# Patient Record
Sex: MALE | Race: WHITE
[De-identification: names, ages, dates, MRNs, and addresses within clinical notes are randomized per-mention and may not be internally consistent; named-entity substitution may affect disease eponyms.]

---

## 2017-10-16 ENCOUNTER — HOSPITAL ENCOUNTER (EMERGENCY)
Dept: HOSPITAL 25 - ED | Age: 22
Discharge: HOME | End: 2017-10-16
Payer: COMMERCIAL

## 2017-10-16 VITALS — DIASTOLIC BLOOD PRESSURE: 72 MMHG | SYSTOLIC BLOOD PRESSURE: 147 MMHG
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
## 2017-10-16 DIAGNOSIS — S43.015A: Primary | ICD-10-CM
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
## 2017-10-16 DIAGNOSIS — S42.292A: ICD-10-CM

## 2017-10-16 PROCEDURE — 96375 TX/PRO/DX INJ NEW DRUG ADDON: CPT

## 2017-10-16 PROCEDURE — 96374 THER/PROPH/DIAG INJ IV PUSH: CPT

## 2017-10-16 PROCEDURE — 99283 EMERGENCY DEPT VISIT LOW MDM: CPT

## 2017-10-16 PROCEDURE — 23650 CLTX SHO DSLC W/MNPJ WO ANES: CPT

## 2017-10-16 NOTE — RAD
Indication: Left shoulder injury.



4 views of left shoulder demonstrates anterior inferior dislocation of left humeral head.



IMPRESSION: Anterior inferior dislocation of left humeral head.

## 2017-10-16 NOTE — RAD
Indication: Left clavicle injury.



2 views of left clavicle demonstrates no fracture. No other bone or joint identified.



IMPRESSION: No fracture of the left clavicle.

## 2017-10-16 NOTE — ED
Upper Extremity Pain





- HPI Summary


HPI Summary: 





21 male presents to ED with complaints of left shoulder pain, injury and 

deformity. States he thinks it is dislocated. Sustained when playing flag 

football and being ran into/tackled just PTA, today. Patient has not taken 

anything for pain. Admits to some numbness/tingling. No other injuries or 

complaints at this time. Has never injured shoulder in the past. No PMHx. 





- History of Current Complaint


Chief Complaint: EDExtremityUpper


Stated Complaint: LT SHOULDER INJURY


Time Seen by Provider: 10/16/17 20:33


Hx Obtained From: Patient


Mechanism Of Injury: Blunt Trauma, Twisted


Onset/Duration: Started Hours Ago, Traumatic, Still Present, Worse Since


Timing: Constant


Severity Initially: Severe


Severity Currently: Severe


Pain Location: Shoulder - left


Character: Sharp, Aching


Aggravating Factor(s): Movement


Alleviating Factor(s): Nothing, Rest


Associated Signs & Symptoms: Positive: Numbness/Tingling, Other - obvious 

deformity


Related History: Dominant Hand Right





- Allergies/Home Medications


Allergies/Adverse Reactions: 


 Allergies











Allergy/AdvReac Type Severity Reaction Status Date / Time


 


No Known Allergies Allergy   Verified 10/16/17 19:49











Home Medications: 


 Home Medications





NK [No Home Medications Reported]  10/16/17 [History Confirmed 10/16/17]











PMH/Surg Hx/FS Hx/Imm Hx


Endocrine/Hematology History: 


   Denies: Hx Diabetes


Cardiovascular History: 


   Denies: Hx Hypertension


Respiratory History: 


   Denies: Hx Asthma





- Surgical History


Surgery Procedure, Year, and Place: none





- Immunization History


Immunizations Up to Date: Yes


Infectious Disease History: No


Infectious Disease History: 


   Denies: Traveled Outside the [place] in Last 30 Days





- Family History


Known Family History: Positive: None





- Social History


Alcohol Use: Weekly


Substance Use Type: Reports: None


Smoking Status (MU): Never Smoked Tobacco





Review of Systems


Constitutional: Negative


Cardiovascular: Negative


Respiratory: Negative


Positive: Arthralgia, Myalgia, Decreased ROM - left shoulder 


Skin: Negative


Positive: Paresthesia


All Other Systems Reviewed And Are Negative: Yes





Physical Exam


Triage Information Reviewed: Yes


Vital Signs On Initial Exam: 


 Initial Vitals











Temp Pulse Resp BP Pulse Ox


 


 98.7 F   84   18   155/84   95 


 


 10/16/17 19:28  10/16/17 19:28  10/16/17 19:28  10/16/17 19:28  10/16/17 19:28








slightly elevated BP however patient in significant pain, decreased throughout 

stay after procedure 


Vital Signs Reviewed: Yes


Appearance: Positive: Well-Appearing, Well-Nourished, Pain Distress - severe


Skin: Positive: Warm, Skin Color Reflects Adequate Perfusion, Dry, Cold - 

slightly colder left hand when compared to right, Pale - left hand however 

strong radial pulse, Other - no significant sweling or ecchymosis noted. 

obvious deformity left shoulder.  Negative: Erythema @


Head/Face: Positive: Normal Head/Face Inspection


Eyes: Positive: Conjunctiva Clear


ENT: Positive: Normal ENT inspection, Hearing grossly normal, Pharynx normal, 

TMs normal


Neck: Positive: Supple, Nontender


Respiratory/Lung Sounds: Positive: Clear to Auscultation, Breath Sounds 

Present.  Negative: Rales, Rhonchi, Wheezes


Cardiovascular: Positive: Normal, RRR, Pulses are Symmetrical in both Upper and 

Lower Extremities - 2+ radial, b/l, normal cap refill.  Negative: Murmur, Rub


Abdomen Description: Positive: Nontender, Soft


Bowel Sounds: Positive: Present


Musculoskeletal: Positive: Limited @ - left UE, Interruption @ - left shoulder, 

Abnormal @ - left shoulder, Pain @, Other - obvious deformity, dislocation left 

shoulder, pain on palpation of left distal clavicle.  Negative: Edema Left


Neurological: Positive: Normal, Sensory/Motor Intact, Alert, Oriented to Person 

Place, Time, CN Intact II-III, Reflexes Intact, NV Bundle Intact Distally, 

Normal Gait





- [place] Coma Scale


Coma Scale Total: 15





Procedures





- Joint Reduction


Joint Reduction Site: shoulder (L)


Conscious Sedation: Yes


Reduction Attempts: 1 - by Dr Smith


Pre-Procedure NV Exam: Yes


Post Joint Reduction Film: no fracture seen





Diagnostics





- Vital Signs


 Vital Signs











  Temp Pulse Resp BP Pulse Ox


 


 10/16/17 22:00   100  19   100


 


 10/16/17 21:55   140  16  134/58  81


 


 10/16/17 21:50   91  18   100


 


 10/16/17 21:45   92  19  133/69  100


 


 10/16/17 21:40   98  20  133/67  100


 


 10/16/17 21:35   93  14  128/66  98


 


 10/16/17 21:30   99  20  135/76  100


 


 10/16/17 21:26    16  


 


 10/16/17 21:25   95  19  137/66  100


 


 10/16/17 21:24   94  23  134/76  100


 


 10/16/17 21:17   97    100


 


 10/16/17 21:15     140/48 


 


 10/16/17 19:35  99 F  91  19  127/63  100


 


 10/16/17 19:28  98.7 F  84  18  155/84  95














- Laboratory


Lab Statement: Any lab studies that have been ordered have been reviewed, and 

results considered in the medical decision making process.





- Radiology


  ** left shoulder


Xray Interpretation: Positive (See Comments) - anterior inferior dislocation of 

left humeral head





  ** left clavicle


Xray Interpretation: No Acute Changes - no fracture of clavicle is seen


Radiology Interpretation Completed By: Radiologist





  ** post reduction left shoulder


Xray Interpretation: Positive (See Comments) - status post external reduction. 

bones are normal aligmnment, this is a hill-sachs fracture present


Radiology Interpretation Completed By: Radiologist





Course/Dx





- Course


Course Of Treatment: xrays obtained showing dislocation of left shoulder. Dr Smith orthopedist 9:30pm preformed mild conscious sedation and reduction of 

left shoulder without complication. Reduced, post reduction xray obtained and 

showed successful reduction however hill-sachs fracture was noted. patient was 

placed in sling. observed after sedation. normal vitals throughout procedure. 

Pain medication at home, follow up with Ever tomorrow. Aware of worsening 

signs and symptoms to watch out for, return if occur. Rest and no physical 

activity/use of left shoulder. Patient agrees and understands plan. Tolerated 

procedure well.





- Diagnoses


Provider Diagnoses: 


 Dislocation of left shoulder joint, Hill-Sachs fracture of left humerus








- Physician Notifications


Discussed Care of Patient With: Dr Smith


Time Discussed With Above Provider: 21:30


Instructed by Provider To: MD Will See In ED - and follow up call for 

appointment





Discharge





- Discharge Plan


Condition: Stable


Disposition: HOME


Patient Education Materials:  Shoulder Dislocation (ED)


Referrals: 


[facility] [Primary Care Provider] - 


Opal Curtis MD [Medical Doctor] - 


Additional Instructions: 


Keep shoulder resting in sling.


Take Advil for pain and inflammation.


Ice. Do not participate in physical activity until cleared by ortho.


Follow up with Ortho, call and make an appointment.


Any new or worsening signs/symptoms please seek medical attention promptly.

## 2017-10-17 NOTE — CONS
CONSULTATION REPORT:

 

DATE OF CONSULT:  10/16/17 - EMERGENCY DEPT

 

CONSULTING SERVICE:  Orthopedics.

 

REQUESTING SERVICE:  ER.

 

CHIEF COMPLAINT:  Left shoulder pain.

 

HISTORY OF PRESENT ILLNESS:  Mamadou is a healthy 21-year-old right hand 
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  He was playing flag football 
this afternoon when his left arm got extended backwards and externally rotated.
  He immediately had left shoulder pain and was brought into the emergency 
room.  Denies any other injuries.  Does note some tingling in his left hand.

 

PAST MEDICAL HISTORY:  Denies.

 

PAST SURGICAL HISTORY:  Denies.

 

MEDICATIONS:  No medications.

 

ALLERGIES:  No known drug allergies.

 

PHYSICAL EXAM:  He is well appearing and in no apparent distress.  Nonlabored 
breathing.  Normal mood and affect.  Examination of the left upper extremity 
reveals skin is intact.  No tenderness to palpation along the clavicle or the 
AC joint.  Tenderness to palpation at the glenohumeral joint and obvious 
deformity. Sensation is intact to light touch in the axillary, radial, median, 
and ulnar nerve distributions, although he does have a bit of tingling in his 
fingertips.  Palpable radial pulse.  Positive EPL, OP and first PATRICK.  Painless 
range of motion of his elbow and wrist.

 

IMAGING:  Left shoulder x-rays reveal an anterior and inferior glenohumeral 
dislocation.  No fractures are appreciated about the clavicle or shoulder.

 

ASSESSMENT AND PLAN:  Mamadou is a healthy 21-year-old right hand dominant 
male with a left shoulder glenohumeral joint dislocation.  This is a first-time 
dislocation.  I discussed the diagnosis and treatment options with him and 
after having this discussion, he decided he would like to move forward with a 
closed reduction.  I explained the risks and benefits to him and received his 
informed written consent.  Conscious sedation was provided by the emergency 
room and I close reduced the left glenohumeral joint with axial traction 
countertraction.  There was a palpable clunk and relief of his pain.  Afterwards
, he was neurovascularly intact and had improved sensation in the fingertips as 
well as being intact in the axillary nerve, radial, median, and ulnar nerves.  
Post-reduction x-rays will be obtained.  He was placed into a sling and will 
remain in that until he follows up with one of my colleagues in Sports Medicine 
in the next 1 to 2 weeks.  All of his questions were answered.

 

 [id number]/[facility] #: [id number]

[facility]

## 2017-10-17 NOTE — RAD
INDICATION:  Left shoulder dislocation.



COMPARISON: Comparison is made with a prior x-ray study of the left shoulder of the same

day.



TECHNIQUE: 3 views of the left shoulder were obtained.



FINDINGS:  The patient is status post external reduction of the left shoulder. The bones

are in normal alignment. There is a Hill-Sachs fracture present. No additional fracture is

seen.  Joint spaces appear maintained.



IMPRESSION:  STATUS POST EXTERNAL REDUCTION. THE BONES ARE NORMAL ALIGNMENT. THERE IS A

HILL-SACHS FRACTURE PRESENT.

## 2018-02-09 ENCOUNTER — HOSPITAL ENCOUNTER (EMERGENCY)
Dept: HOSPITAL 25 - ED | Age: 23
Discharge: HOME | End: 2018-02-09
Payer: COMMERCIAL

## 2018-02-09 VITALS — DIASTOLIC BLOOD PRESSURE: 82 MMHG | SYSTOLIC BLOOD PRESSURE: 122 MMHG

## 2018-02-09 DIAGNOSIS — S43.005A: Primary | ICD-10-CM
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
## 2018-02-09 PROCEDURE — 99283 EMERGENCY DEPT VISIT LOW MDM: CPT

## 2018-02-09 PROCEDURE — 23650 CLTX SHO DSLC W/MNPJ WO ANES: CPT

## 2018-02-09 PROCEDURE — 96374 THER/PROPH/DIAG INJ IV PUSH: CPT

## 2018-02-09 PROCEDURE — 96376 TX/PRO/DX INJ SAME DRUG ADON: CPT

## 2018-02-09 PROCEDURE — 96360 HYDRATION IV INFUSION INIT: CPT

## 2018-02-09 NOTE — RAD
HISTORY: Post reduction



COMPARISONS: February 09, 2018 at 6:04 PM, October 16, 2017



VIEWS: 2, frontal and outlet views of the left shoulder    



FINDINGS:



BONE DENSITY: Normal.

BONES: There is a Hill-Sachs deformity of the humeral head. This is similar to the

previous examination.    

JOINTS: There is no arthropathy.    

ALIGNMENT: There has been interval reduction of the glenohumeral dislocation.

SOFT TISSUES: Unremarkable.



OTHER FINDINGS: None.



IMPRESSION: 

INTERVAL REDUCTION OF LEFT SHOULDER DISLOCATION

## 2018-02-09 NOTE — ED
Ap KAUR Jennifer, scribed for Chris Greene MD on 02/09/18 at 1749 .





Upper Extremity Pain





- HPI Summary


HPI Summary: 





The patient is a 22 year old male who presents to the ED with left shoulder 

pain after playing basketball about 40 minutes ago. The patient has had 

multiple injuries to the left shoulder in the past and says it feels like the 

time he dislocated his shoulder. He also complains of some numbness in his left 

fingers.





- History of Current Complaint


Chief Complaint: EDExtremityUpper


Stated Complaint: LT SHOULDER INJURY


Hx Obtained From: Patient


Mechanism Of Injury: Other - Playing basketball


Onset/Duration: Started Minutes Ago - 40 minutes ago


Timing: Constant


Severity Initially: Moderate


Severity Currently: Moderate


Pain Location: Shoulder


Aggravating Factor(s): Movement


Alleviating Factor(s): Nothing


Related History: Similar Episode/Dx As





- Allergies/Home Medications


Allergies/Adverse Reactions: 


 Allergies











Allergy/AdvReac Type Severity Reaction Status Date / Time


 


No Known Allergies Allergy   Verified 10/16/17 19:49














PMH/Surg Hx/FS Hx/Imm Hx


Endocrine/Hematology History: 


   Denies: Hx Diabetes


Cardiovascular History: 


   Denies: Hx Hypertension


Respiratory History: 


   Denies: Hx Asthma





- Surgical History


Surgery Procedure, Year, and Place: none


Infectious Disease History: No


Infectious Disease History: 


   Denies: Traveled Outside the [place] in Last 30 Days





- Family History


Known Family History: 


   Negative: Renal Disease





- Social History


Alcohol Use: Weekly


Substance Use Type: Reports: None


Smoking Status (MU): Never Smoked Tobacco





Review of Systems


Negative: Fever


Positive: Other - Shoulder pain


Positive: Numbness - Left fingers


All Other Systems Reviewed And Are Negative: Yes





Physical Exam





- Summary


Physical Exam Summary: 





Appearance: Well-appearing, Well-nourished, no obvious deformity


Skin: Warm, Dry, No rash


Eyes: Normal, PERRL, EOMI, sclera anicteric


ENT: Normal


Neck: Supple, nontender


Respiratory: Clear to auscultation


Cardiovascular: S1, S2, no murmur, no rub, no gallop


Abdomen: Soft, nontender, no organomegaly


Bowel sounds: Present


Musculoskeletal: Normal, Strength/ROM Intact, no edema, pulses symmetrical


Extremities: flexion and extension of fingers, refuses to move arm at all, some 

numbness in radial distribution on the skin.


Neurological: Normal, A&Ox3, cranial nerves II-XII WNL, follows commands, gait 

not tested, sensation intact to pin and light touch


Psychiatric: affect normal, behavior appropriate, dressed appropriately, 

judgment intact


Triage Information Reviewed: Yes


Vital Signs On Initial Exam: 


 Initial Vitals











Temp Pulse Resp BP Pulse Ox


 


 98.0 F   90   22   143/65   100 


 


 02/09/18 17:31  02/09/18 17:31  02/09/18 17:31  02/09/18 17:31  02/09/18 17:31











Vital Signs Reviewed: Yes





Procedures





- Procedure Summary


Procedure Summary: 





Conscious sedation performed for left anterior shoulder dislocation. 150 

micrograms total of Fentanyl and 7 mg of Versed were given. The Kocker method 

for reducing the shoulder was tolderated without difficulty. Post procedure 

neurovascular intact, wrist flexion and extension normal, interosseous muscles 

of fingers normal, strength intact.





Diagnostics





- Vital Signs


 Vital Signs











  Temp Pulse Resp BP Pulse Ox


 


 02/09/18 17:31  98.0 F  90  22  143/65  100














- Laboratory


Lab Statement: Any lab studies that have been ordered have been reviewed, and 

results considered in the medical decision making process.





- Radiology


  ** Shoulder XR


Xray Interpretation: Positive (See Comments) - LEFT SHOULDER DISLOCATION. Dr. Greene has reviewed this report.


Radiology Interpretation Completed By: Radiologist





Course/Dx





- Course


Assessment/Plan: The patient is a 22 year old male who presents to the ED with 

left shoulder pain after playing basketball about 40 minutes ago. In the ED 

course the patient was given IV fluids and Morphine. Shoulder XR showed LEFT 

SHOULDER DISLOCATION.  The patient is diagnosed with left anterior shoulder 

dislocation. The patient tolerated Kocker method for reducing the shoulder 

without difficulty. The patient is instructed to follow up with Dr. Herron, 

orthopedics.





- Diagnoses


Provider Diagnoses: 


 Anterior dislocation of left shoulder








Discharge





- Discharge Plan


Condition: Stable


Disposition: HOME


[facility] 


[facility] - 


Additional Instructions: 


RETURN TO THE EMERGENCY DEPARTMENT FOR CHANGING OR WORSENING SYMPTOMS.





The documentation as recorded by the Ap benítez Jennifer accurately reflects 

the service I personally performed and the decisions made by me, Crhis Greene MD.

## 2018-02-09 NOTE — RAD
HISTORY: Left shoulder pain



COMPARISONS: October 16, 2017



VIEWS: 2, frontal and lateral views of the left shoulder    



FINDINGS:



BONE DENSITY: Normal.

BONES: There is no displaced fracture.    

JOINTS: There is no arthropathy.    

ALIGNMENT: There is anterior-inferior dislocation of the humeral head with respect to the

glenoid fossa.

SOFT TISSUES: Unremarkable.



OTHER FINDINGS: None.



IMPRESSION: 

LEFT SHOULDER DISLOCATION

## 2018-05-28 ENCOUNTER — HOSPITAL ENCOUNTER (EMERGENCY)
Dept: HOSPITAL 25 - ED | Age: 23
Discharge: HOME | End: 2018-05-28
Payer: COMMERCIAL

## 2018-05-28 VITALS — SYSTOLIC BLOOD PRESSURE: 153 MMHG | DIASTOLIC BLOOD PRESSURE: 73 MMHG
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
## 2018-05-28 DIAGNOSIS — S43.015A: Primary | ICD-10-CM
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
## 2018-05-28 PROCEDURE — 23650 CLTX SHO DSLC W/MNPJ WO ANES: CPT

## 2018-05-28 PROCEDURE — 99282 EMERGENCY DEPT VISIT SF MDM: CPT

## 2018-05-28 PROCEDURE — 96372 THER/PROPH/DIAG INJ SC/IM: CPT

## 2018-05-28 NOTE — RAD
HISTORY: Left shoulder pain



COMPARISONS: February 09, 2013



VIEWS: 3, Frontal internal rotation, external rotation, and outlet views of the left

shoulder



FINDINGS:



BONE DENSITY: Normal.

BONES: There is no displaced fracture.    

JOINTS: There is no arthropathy.    

ALIGNMENT: There is anterior-inferior dislocation of the humeral head with respect to the

glenoid fossa.

SOFT TISSUES: Unremarkable.



OTHER FINDINGS: None.



IMPRESSION: 

LEFT SHOULDER DISLOCATION

## 2018-05-28 NOTE — ED
Tico KAUR Angela, scribed for Phillip Dubois MD on 05/28/18 at 1131 .





Upper Extremity Pain





- HPI Summary


HPI Summary: 





This pt is a 21 y/o male presenting to [facility] via EMS c/o left shoulder pain s/p 

injury. Pt reports he was picking up furniture this morning and when he picked 

it up with his left arm he heard a pop in his left shoulder. Pt states he had 

his left shoulder dislocated twice before in the past. Pt was told to have 

surgery done if his shoulder became dislocated again. He notes he was not able 

to get surgery as he has been in school. 


NKDA. 


Denies any PMHx. 





- History of Current Complaint


Stated Complaint: LT SHOULDER INJURY


Hx Obtained From: Patient


Mechanism Of Injury: Other - s/p picking up furniture


Onset/Duration: Traumatic, Still Present


Timing: Constant


Severity Currently: Severe


Pain Location: Shoulder - left


Aggravating Factor(s): Movement


Alleviating Factor(s): Rest


Associated Signs & Symptoms: Negative: Swelling, Redness, Bruising, Fever, 

Weakness, Numbness/Tingling, Chest Pain, Back Pain, Neck Pain


Related History: Similar Episode/Dx As - similar episode twice before





- Allergies/Home Medications


Allergies/Adverse Reactions: 


 Allergies











Allergy/AdvReac Type Severity Reaction Status Date / Time


 


No Known Allergies Allergy   Verified 10/16/17 19:49














PMH/Surg Hx/FS Hx/Imm Hx


Endocrine/Hematology History: 


   Denies: Hx Diabetes


Cardiovascular History: 


   Denies: Hx Hypertension


Respiratory History: 


   Denies: Hx Asthma





- Surgical History


Surgery Procedure, Year, and Place: none





- Family History


Known Family History: 


   Negative: Renal Disease





- Social History


Alcohol Use: Weekly


Substance Use Type: Reports: None


Smoking Status (MU): Never Smoked Tobacco





Review of Systems


Negative: Fever


Eyes: Negative


ENT: Negative


Cardiovascular: Negative


Respiratory: Negative


Musculoskeletal: Other - left shoulder pain


Neurological: Negative


All Other Systems Reviewed And Are Negative: Yes





Physical Exam





- Summary


Physical Exam Summary: 





VITAL SIGNS: Reviewed.


GENERAL: Patient is a well-developed and nourished male who is lying 

comfortable in the stretcher. Patient is not in any acute respiratory distress.


HEAD AND FACE: No signs of trauma. No ecchymosis, hematomas or skull 

depressions. No sinus tenderness.


EYES: PERRLA, EOMI x 2, No injected conjunctiva, no nystagmus.


EARS: Hearing grossly intact. Ear canals and tympanic membranes are within 

normal limits.


MOUTH: Oropharynx within normal limits.


NECK: Supple, trachea is midline, no adenopathy, no JVD, no carotid bruit, no c-

spine tenderness, neck with full ROM.


CHEST: Symmetric, no tenderness at palpation


LUNGS: Clear to auscultation bilaterally. No wheezing or crackles.


CVS: Regular rate and rhythm, S1 and S2 present, no murmurs or gallops 

appreciated.


ABDOMEN: Soft, non-tender. No signs of distention. No rebound no guarding, and 

no masses palpated. Bowel sounds are normal.


EXTREMITIES:  no edema, no cyanosis or clubbing. Deformity in the left 

shoulder. Pulses are good. Pt is neurovascular intact.


NEURO: Alert and oriented x 3. No acute neurological deficits. Speech is normal 

and follows commands.


SKIN: Dry and warm


Triage Information Reviewed: Yes


Vital Signs On Initial Exam: 





Initial Vitals











Temp Pulse Resp BP Pulse Ox


 


 98.2 F   85   16   129/76   98 


 


 05/28/18 11:25  05/28/18 11:25  05/28/18 11:25  05/28/18 11:25  05/28/18 11:25








Vital Signs Reviewed: Yes





Procedures





- Joint Reduction


Joint Reduction Site: shoulder (L)


Conscious Sedation: No


Reduction Attempts: 1 - successful


Pre-Procedure NV Exam: Yes - pre and post procedure neurovascular intact


Post Joint Reduction Film: joint reduced





Diagnostics





- Vital Signs


 Vital Signs











  Temp Pulse Resp BP Pulse Ox


 


 05/28/18 11:25  98.2 F  85  16  129/76  98














- Laboratory


Lab Statement: Any lab studies that have been ordered have been reviewed, and 

results considered in the medical decision making process.





- Radiology


  ** Left shoulder XR


Xray Interpretation: Positive (See Comments) - IMPRESSION: Left shoulder 

dislocation. Dr. Dubois has reviewed this radiology report.


Radiology Interpretation Completed By: Radiologist





Re-Evaluation





- Re-Evaluation


  ** First Eval


Re-Evaluation Time: 11:50


Comment: Joint reduction performed.





Course/Dx





- Course


Assessment/Plan: This patient is a 22-year-old male who presents to the 

emergency room with left shoulder pain.  X-ray of the left shoulder shows a 

positive dislocation.  The patient was given Toradol 60 mg IM.  We applied 

traction and we successively reduced the dislocation.  Postreduction x-ray 

impression: Dislocation was reduced.  The patient was placed in a shoulder 

immobilizer.  Patient will be discharged home with follow-up with primary care 

physician and orthopedics.  He reports that he will go back home and he will 

follow up with an orthopedic home.  Patient is hemodynamically stable.





- Diagnoses


Differential Diagnosis/HQI/PQRI: Positive: Bursitis, Fracture (Open), Strain, 

Sprain


Provider Diagnoses: 


 Shoulder dislocation








Discharge





- Sign-Out/Discharge


Documenting (check all that apply): Discharge/Admit/Transfer





- Discharge Plan


Condition: Stable


Disposition: HOME


Patient Education Materials:  Shoulder Dislocation (ED)


Referrals: 


[facility] [Primary Care Provider] - 


Opal Curtis MD [Medical Doctor] - 


Additional Instructions: 





Please follow up with orthopedics, Dr. Curtis.





Follow up with your primary care provider. 





RETURN TO THE ED FOR ANY NEW OR WORSENING SYMPTOMS. 





- Billing Disposition and Condition


Condition: STABLE


Disposition: HOME





The documentation as recorded by the Tico benítez Angela accurately reflects 

the service I personally performed and the decisions made by me, Phillip Dubois MD.

## 2018-05-28 NOTE — RAD
HISTORY: Status post reduction



COMPARISONS: May 28, 2018 at 11:39 AM



VIEWS: 1, single frontal view of the left shoulder in internal rotation    



FINDINGS:



BONE DENSITY: Normal.

BONES: There is no displaced fracture.    

JOINTS: There is no arthropathy.    

ALIGNMENT: On this single projection, there has been interval reduction of the

glenohumeral dislocation.

SOFT TISSUES: Unremarkable.



OTHER FINDINGS: None.



IMPRESSION: 

LIMITED SINGLE FRONTAL PROJECTION OF THE LEFT SHOULDER DEMONSTRATE INTERVAL REDUCTION OF

THE LEFT SHOULDER DISLOCATION

## 2018-06-25 ENCOUNTER — APPOINTMENT (OUTPATIENT)
Dept: PEDIATRICS | Facility: CLINIC | Age: 23
End: 2018-06-25
Payer: COMMERCIAL

## 2018-06-25 VITALS — WEIGHT: 151 LBS | TEMPERATURE: 98.6 F

## 2018-06-25 DIAGNOSIS — Z87.39 PERSONAL HISTORY OF OTHER DISEASES OF THE MUSCULOSKELETAL SYSTEM AND CONNECTIVE TISSUE: ICD-10-CM

## 2018-06-25 DIAGNOSIS — H10.32 UNSPECIFIED ACUTE CONJUNCTIVITIS, LEFT EYE: ICD-10-CM

## 2018-06-25 PROBLEM — Z00.00 ENCOUNTER FOR PREVENTIVE HEALTH EXAMINATION: Status: ACTIVE | Noted: 2018-06-25

## 2018-06-25 PROCEDURE — 99214 OFFICE O/P EST MOD 30 MIN: CPT

## 2018-06-25 RX ORDER — CIPROFLOXACIN 3 MG/ML
0.3 SOLUTION OPHTHALMIC TWICE DAILY
Qty: 5 | Refills: 0 | Status: ACTIVE | COMMUNITY
Start: 2018-06-25 | End: 1900-01-01

## 2018-06-25 NOTE — DISCUSSION/SUMMARY
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

## 2018-06-25 NOTE — PHYSICAL EXAM
[No Acute Distress] : no acute distress [Alert] : alert [EOMI] : EOMI [Discharge] : discharge [Left] : (left) [Clear TM bilaterally] : clear tympanic membranes bilaterally [Pink Nasal Mucosa] : pink nasal mucosa [Nonerythematous Oropharynx] : nonerythematous oropharynx [Nontender Cervical Lymph Nodes] : nontender cervical lymph nodes [Clear to Ausculatation Bilaterally] : clear to auscultation bilaterally [Regular Rate and Rhythm] : regular rate and rhythm [No Murmurs] : no murmurs [Soft] : soft [NonTender] : non tender [Normal Bowel Sounds] : normal bowel sounds [Steven: ____] : Steven [unfilled] [No Abnormal Lymph Nodes Palpated] : no abnormal lymph nodes palpated [Moves All Extremities x 4] : moves all extremities x4 [Normotonic] : normotonic [NL] : warm [Warm] : warm [Dry] : dry [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

## 2019-06-05 ENCOUNTER — RX RENEWAL (OUTPATIENT)
Age: 24
End: 2019-06-05

## 2019-06-05 DIAGNOSIS — J45.909 UNSPECIFIED ASTHMA, UNCOMPLICATED: ICD-10-CM

## 2019-06-05 RX ORDER — MONTELUKAST 10 MG/1
10 TABLET, FILM COATED ORAL
Qty: 90 | Refills: 0 | Status: ACTIVE | COMMUNITY
Start: 2019-06-05 | End: 1900-01-01

## 2020-09-18 ENCOUNTER — TRANSCRIPTION ENCOUNTER (OUTPATIENT)
Age: 25
End: 2020-09-18

## 2020-09-25 ENCOUNTER — TRANSCRIPTION ENCOUNTER (OUTPATIENT)
Age: 25
End: 2020-09-25

## 2020-09-28 ENCOUNTER — TRANSCRIPTION ENCOUNTER (OUTPATIENT)
Age: 25
End: 2020-09-28

## 2020-10-06 ENCOUNTER — TRANSCRIPTION ENCOUNTER (OUTPATIENT)
Age: 25
End: 2020-10-06

## 2020-10-14 ENCOUNTER — APPOINTMENT (OUTPATIENT)
Dept: PEDIATRICS | Facility: CLINIC | Age: 25
End: 2020-10-14
Payer: COMMERCIAL

## 2020-10-14 DIAGNOSIS — Z23 ENCOUNTER FOR IMMUNIZATION: ICD-10-CM

## 2020-10-14 PROCEDURE — 90471 IMMUNIZATION ADMIN: CPT

## 2020-10-14 PROCEDURE — 90686 IIV4 VACC NO PRSV 0.5 ML IM: CPT

## 2020-10-18 ENCOUNTER — TRANSCRIPTION ENCOUNTER (OUTPATIENT)
Age: 25
End: 2020-10-18

## 2023-08-15 ENCOUNTER — APPOINTMENT (OUTPATIENT)
Dept: ORTHOPEDIC SURGERY | Facility: CLINIC | Age: 28
End: 2023-08-15
Payer: COMMERCIAL

## 2023-08-15 VITALS — HEIGHT: 68 IN | BODY MASS INDEX: 21.22 KG/M2 | WEIGHT: 140 LBS

## 2023-08-15 PROCEDURE — 99214 OFFICE O/P EST MOD 30 MIN: CPT

## 2023-08-15 PROCEDURE — 73030 X-RAY EXAM OF SHOULDER: CPT | Mod: LT

## 2023-08-15 PROCEDURE — 73010 X-RAY EXAM OF SHOULDER BLADE: CPT | Mod: LT

## 2023-08-15 NOTE — DISCUSSION/SUMMARY
[de-identified] : Discussed options. Start PT. Obtain CT--Rule out bone loss MRI eval labral tearing and cartilage injury. to further evaluate. Continue to wear sling, avoid overhead lifting, rest/ice Discussed/will likely recommend surgery based on findings.  ----------------------------------------------------------------------------  The patient was advised that an advanced diagnostic/imaging study (MRI/CT/etc) will be ordered to evaluate potential pathology in the affected area(s).  The patient was further advised to follow up in the office to review the results of the study and determine further management that may be indicated.  ----------------------------------------------------------------------------  All relevant imaging studies pertinent to today's visit, including x-rays, MRI's and/or other advanced imaging studies (CT/etc) were independently interpreted and reviewed with the patient as needed. Implications of the studies together with the patient's clinical picture were discussed to formulate a working diagnosis and management options were detailed.  The patient was advised of the diagnosis.  The natural history of the pathology was explained in full. All questions were answered.  The risks and benefits of conservative and interventional treatment alternatives were explained to the patient

## 2023-08-15 NOTE — IMAGING
[de-identified] :   ----------------------------------------------------------------------------   Left shoulder exam:    Skin: no significant pertinent finding  Inspection: no obvious deformity, no obvious masses, no swelling, no effusion, no atrophy  ROM:     FF: 120 vs 175     ER: 45 vs 80       Tenderness:     (+) Anterior/Biceps:     (neg) Posterior     (neg) Lateral     (neg) Trapezius     (neg) Scapula     (neg) AC joint     (neg) Crepitus with ROM  Stability:     (neg) Translation     (neg) Apprehension     (neg) Clicking  Additional tests:     (neg) Neer's     (+)Hawkin's     (neg) Mike's     (neg) Speed     (neg) Cross chest adduction  Strength:     FF: 5/5 painful      ER: 5/5     IR: 5/5     Biceps: 5/5     Triceps: 5/5     Distal: 5/5  Neuro: In tact to light touch throughout  Vascularity: Extremity warm and well perfused   [Left] : left shoulder [Outside films reviewed] : Outside films reviewed [FreeTextEntry1] : No obvious fracture/ dislocation/ malalignment

## 2023-08-15 NOTE — HISTORY OF PRESENT ILLNESS
[Gradual] : gradual [5] : 5 [3] : 3 [de-identified] : JANNY CORNELL is a 27 year old M that is here today complaining of pain in his left shoulder. Pt reports that he did surgery with Dr. Holder back in 2018 for a labrum tear. Since the surgery, his left shoulder has dislocated 3 times; most recently about 5-6 days ago. Pt reports that he was simply reaching for his trunk to close it when it dislocated the last time. Pt has done PT in 2020 and didn't an issues for 2 years before the last incident. Pt visited [facility] ER on 08/11/23 where he did X-Rays.  [] : no [de-identified] : Certain Movements  [FreeTextEntry1] : Left Shoulder [de-identified] : 08/11/23 [de-identified] : [facility] ER  [de-identified] : X-Rays

## 2023-08-24 ENCOUNTER — RESULT REVIEW (OUTPATIENT)
Age: 28
End: 2023-08-24

## 2023-08-29 ENCOUNTER — APPOINTMENT (OUTPATIENT)
Dept: ORTHOPEDIC SURGERY | Facility: CLINIC | Age: 28
End: 2023-08-29
Payer: COMMERCIAL

## 2023-08-29 VITALS — BODY MASS INDEX: 21.22 KG/M2 | WEIGHT: 140 LBS | HEIGHT: 68 IN

## 2023-08-29 DIAGNOSIS — S43.432A SUPERIOR GLENOID LABRUM LESION OF LEFT SHOULDER, INITIAL ENCOUNTER: ICD-10-CM

## 2023-08-29 DIAGNOSIS — M24.412 RECURRENT DISLOCATION, LEFT SHOULDER: ICD-10-CM

## 2023-08-29 DIAGNOSIS — M21.822 OTHER SPECIFIED ACQUIRED DEFORMITIES OF LEFT UPPER ARM: ICD-10-CM

## 2023-08-29 PROCEDURE — 99215 OFFICE O/P EST HI 40 MIN: CPT

## 2023-08-29 NOTE — DISCUSSION/SUMMARY
[de-identified] : Discussed options. Talked about redislocation risks with conservative treatment and therapy. Discussed details and re-dislocation risks of  Latarjet vs  labral repair and remplissage procedure. 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  with on track injury with HSI 15.7 <   GT 17.8 Recommendded labral repair + remplissage, but advised higher redislocation risk than latarjet.  patient will cosnider options ----------------------------------------------------------------------------  All relevant imaging studies pertinent to today's visit, including x-rays, MRI's and/or other advanced imaging studies (CT/etc) were independently interpreted and reviewed with the patient as needed. Implications of the studies together with the patient's clinical picture were discussed to formulate a working diagnosis and management options were detailed.  The patient was advised of the diagnosis.  The natural history of the pathology was explained in full. All questions were answered.  The risks and benefits of conservative and interventional treatment alternatives were explained to the patient   ----------------------------------------------------------------------------  The patient was advised of the diagnosis.  The natural history of the pathology was explained in full to the patient. All questions were answered.  The risks and benefits of surgical and non-surgical treatment were explained in full to the patient.  The patient demonstrated a full understanding of the surgical and non-surgical options.  The risks of surgery were outlined in full to the patient including but not limited to pain, stiffness, bleeding, scarring, infection, neurologic injury, vascular injury, failure to resolve symptoms, symptom recurrence, the need for further surgery, non-healing, implant failure, intraoperative fracture, wound breakdown, deep vein thrombosis, pulmonary embolism, anesthesia complications and even death.  The patient understood all the risks and accepted them and understood that other complications could occur that are not mentioned above.  The intraoperative plan, post-operative plan, post-operative expectations and limitations were explained in full.  Importance of postoperative rehabilitation and restriction compliance was explained and emphasized. Expectations from non-surgical treatment were explained in full as well.  The patient demonstrated a complete understanding of the treatment detailed, the risks and alternatives.

## 2023-08-29 NOTE — HISTORY OF PRESENT ILLNESS
[Gradual] : gradual [5] : 5 [0] : 0 [de-identified] : JANNY CORNELL is a 27 year old M that is here today complaining of pain in his left shoulder. Pt reports that he did surgery with Dr. Holder back in 2018 for a labrum tear. Since the surgery, his left shoulder has dislocated 3 times; most recently about 5-6 days ago. Pt reports that he was simply reaching for his trunk to close it when it dislocated the last time. Pt has done PT in 2020 and didn't an issues for 2 years before the last incident. Pt visited [facility] ER on 08/11/23 where he did X-Rays.  [] : no [FreeTextEntry1] : Left Shoulder [de-identified] : Certain Movements  [de-identified] : 08/11/23 [de-identified] : [facility] ER  [de-identified] : X-Rays  [de-identified] : MRI

## 2023-08-29 NOTE — IMAGING
[Left] : left shoulder [Outside films reviewed] : Outside films reviewed [de-identified] :   ----------------------------------------------------------------------------   Left shoulder exam:    Skin: no significant pertinent finding  Inspection: no obvious deformity, no obvious masses, no swelling, no effusion, no atrophy  ROM:     FF: 160 vs 175     ER: 45 vs 80       Tenderness:     (+) Anterior/Biceps:     (neg) Posterior     (neg) Lateral     (neg) Trapezius     (neg) Scapula     (neg) AC joint     (neg) Crepitus with ROM  Stability:     (neg) Translation     (neg) Apprehension     (neg) Clicking  Additional tests:     (neg) Neer's     (+)Hawkin's     (neg) Mike's     (neg) Speed     (neg) Cross chest adduction  Strength:     FF: 5/5 painful      ER: 5/5     IR: 5/5     Biceps: 5/5     Triceps: 5/5     Distal: 5/5  Neuro: In tact to light touch throughout  Vascularity: Extremity warm and well perfused   [FreeTextEntry1] : No obvious fracture/ dislocation/ malalignment

## 2023-08-29 NOTE — DATA REVIEWED
[MRI] : MRI [Report was reviewed and noted in the chart] : The report was reviewed and noted in the chart [CT Scan] : CT scan [Left] : left [Shoulder] : shoulder [I independently reviewed and interpreted images and report] : I independently reviewed and interpreted images and report [FreeTextEntry1] : SLAP Tear,  Ant/inf labral tear. [FreeTextEntry2] : Hill sachs 15.7 mm.  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

## 2024-08-31 ENCOUNTER — NON-APPOINTMENT (OUTPATIENT)
Age: 29
End: 2024-08-31